# Patient Record
Sex: FEMALE | Race: BLACK OR AFRICAN AMERICAN | Employment: OTHER | URBAN - METROPOLITAN AREA
[De-identification: names, ages, dates, MRNs, and addresses within clinical notes are randomized per-mention and may not be internally consistent; named-entity substitution may affect disease eponyms.]

---

## 2022-07-12 ENCOUNTER — OFFICE VISIT (OUTPATIENT)
Dept: ORTHOPEDIC SURGERY | Age: 77
End: 2022-07-12
Payer: MEDICARE

## 2022-07-12 VITALS — WEIGHT: 180 LBS | HEIGHT: 60 IN | BODY MASS INDEX: 35.34 KG/M2

## 2022-07-12 DIAGNOSIS — M25.561 PAIN IN BOTH KNEES, UNSPECIFIED CHRONICITY: Primary | ICD-10-CM

## 2022-07-12 DIAGNOSIS — M17.11 PRIMARY OSTEOARTHRITIS OF RIGHT KNEE: ICD-10-CM

## 2022-07-12 DIAGNOSIS — M17.12 PRIMARY OSTEOARTHRITIS OF LEFT KNEE: Primary | ICD-10-CM

## 2022-07-12 DIAGNOSIS — M17.12 PRIMARY OSTEOARTHRITIS OF LEFT KNEE: ICD-10-CM

## 2022-07-12 DIAGNOSIS — M25.562 PAIN IN BOTH KNEES, UNSPECIFIED CHRONICITY: Primary | ICD-10-CM

## 2022-07-12 PROBLEM — M43.16 SPONDYLOLISTHESIS OF LUMBAR REGION: Status: ACTIVE | Noted: 2018-08-07

## 2022-07-12 PROBLEM — N18.30 STAGE 3 CHRONIC KIDNEY DISEASE (HCC): Status: ACTIVE | Noted: 2018-09-17

## 2022-07-12 PROCEDURE — 99204 OFFICE O/P NEW MOD 45 MIN: CPT | Performed by: ORTHOPAEDIC SURGERY

## 2022-07-12 PROCEDURE — 1123F ACP DISCUSS/DSCN MKR DOCD: CPT | Performed by: ORTHOPAEDIC SURGERY

## 2022-07-12 RX ORDER — MELOXICAM 7.5 MG/1
7.5 TABLET ORAL DAILY
Qty: 30 TABLET | Refills: 3 | Status: SHIPPED | OUTPATIENT
Start: 2022-07-12

## 2022-07-12 RX ORDER — LOSARTAN POTASSIUM 50 MG/1
TABLET ORAL
COMMUNITY
Start: 2022-06-07

## 2022-07-12 RX ORDER — LEVOTHYROXINE SODIUM 0.1 MG/1
TABLET ORAL
COMMUNITY
Start: 2022-03-15

## 2022-07-12 RX ORDER — EMPAGLIFLOZIN AND METFORMIN HYDROCHLORIDE 12.5; 1 MG/1; MG/1
TABLET ORAL
COMMUNITY
Start: 2022-07-04

## 2022-07-12 RX ORDER — GLIMEPIRIDE 1 MG/1
TABLET ORAL
COMMUNITY
Start: 2022-03-15

## 2022-07-12 RX ORDER — LOSARTAN POTASSIUM 100 MG/1
TABLET ORAL
COMMUNITY
Start: 2022-06-20

## 2022-07-12 RX ORDER — DOXYCYCLINE HYCLATE 50 MG/1
CAPSULE, GELATIN COATED ORAL
COMMUNITY
Start: 2021-12-13

## 2022-07-12 RX ORDER — MELOXICAM 7.5 MG/1
7.5 TABLET ORAL DAILY
Qty: 30 TABLET | Refills: 3 | Status: CANCELLED | OUTPATIENT
Start: 2022-07-12

## 2022-07-12 RX ORDER — ASPIRIN 81 MG/1
81 TABLET ORAL DAILY
COMMUNITY

## 2022-07-12 RX ORDER — LIDOCAINE AND PRILOCAINE 25; 25 MG/G; MG/G
CREAM TOPICAL
COMMUNITY
Start: 2022-03-14

## 2022-07-12 NOTE — PROGRESS NOTES
Name: Francisco Javier Sheldon  YOB: 1945  Gender: female  MRN: [id number]    CC:   Chief Complaint   Patient presents with    Knee Pain     bilateral knee pain         HPI: Francisco Javier Sheldon is a 77 y.o. female with a PMHx of HTN, DM and s/p lumbar fusion in 2018 here for evaluation of bilateral knee pain, but the right knee bothers her more. The pain has been present for several years and has become worse over the last year. The pain is primarily on the Lateral side. she describes the pain as dull, aching, sharp, throbbing, grinding and and often feels unstable  The pain is worse with any weight bearing, going up and/or down stairs, rising after sitting and walking  The pain does not radiate down the leg, but sometimes with exercise the pain will radiate up to her hip. She has not followed up with Dr. Mendel Herald since her surgery in 2018. She denies any lower back pain today. Treatment so far has been activity modification and Tylenol With temporary relief. Allergies   Allergen Reactions    Banana Cough     \"I still eat them\"  \"I still eat them\"      Peanut-Containing Drug Products Cough     \"I still eat them\"  \"I still eat them\"      Lisinopril Cough         Review of Systems:  As per HPI. Pertinent positives and negatives are addressed with the patient, particularly those related to musculoskeletal concerns. Non-orthopaedic concerns were referred back to the primary care physician. PHYSICAL EXAMINATION:   The patient appears their stated age and they are in no distress. Ht 5' (1.524 m)   Wt 180 lb (81.6 kg)   BMI 35.15 kg/m²       The lower extremities are as described below. Circulation is normal with palpable pedal pulses bilaterally and no edema. There is no lymph adenopathy in the popliteal or malleolar region. The skin is without stasis disease distally bilaterally. Sensation is intact to light touch bilaterally.   There is mild tenderness to palpation over the lateral joint line of the right knee(s)  No tenderness of the left knee  The gait is noted to be with a slight trendelenburg and antalgia and and ambulates with a Cane  Range of motion is 0-120 degrees on the right and 0-120 degrees on the left. bilateral knee: There is 2mm of anterior/posterior translation and 2mm of medial/lateral instability bilaterally. There is 2 degrees of varus alignment in the bilateral knee. Limb lengths are equal.  Quadriceps strength is excellent. Positive straight leg raise on the right  Their judgment and insight are normal.  They are oriented to time, place and person. Their memory is good and the mood and affect appropriate. X-RAY:   AP, lateral, sunrise and 45 degree  views of the bilateral knees are reviewed. There is some moderate joint space loss within the medial compartment bilaterally, eburnated bone, and osteophyte formation present. X-ray impression:  Moderate osteoarthropathy of the bilateral  knee      IMPRESSION:     ICD-10-CM    1. Pain in both knees, unspecified chronicity  M25.561 XR KNEE BILATERAL STANDING    M25.562        RECOMMENDATIONS:    Reviewed x-ray findings with the patient. This patient's clinical history and physical exam is consistent with osteoarthritis of the bilateral  knee(s). We discussed the natural history of this condition as a degenerative process that causes inflammation of the joints due to a breakdown of cartilage, the hard, thick tissue that cushions the joints. We discussed that osteoarthritis never completely resolves on its own and symptoms including pain, stiffness, and swelling may wax and wane as the condition progresses. She understands that conservative treatments typically include activity modification, NSAIDs and physical therapy. Oral and/or steroid injections into the joint could be considered in resistant scenarios. Viscosupplementation injections may also be useful as a temporary cartilage replacement in certain patients.  I advised to avoid any prolonged bedrest and to try to maintain ADLs as much as possible. The patient was counseled to follow up with me should she develop any worsening symptoms that begin to limit activities of daily living.     ----The patient will be treated observantly with self directed symptomatic care. Instructions were given to follow up if there is any neurologic or functional decline.  ---- Physical Therapy was prescribed and will include stretching, strengthening and modalities to promote blood flow, flexibility and core strengthening.  ---- NSAID: The patient is agreeable to a trial of nonsteroidal anti-inflammatory drugs (NSAIDs). We discussed risks associated with their use, including GI tract or other bleeding, and also some cardiac risk. Instructions were given to discontinue the NSAID if there is any sign of GI bleed, chest pain, or shortness of breath.    ---- Injection: The patient will be pre-certed for a visco injection to help reduce the symptoms. The patient understands the risks and benefits of the medication. The patient may benefit from additional injections depending on the response.        4--this is a chronic illness/condition with exacerbation

## 2022-07-12 NOTE — LETTER
Joint Injection Precert Authorization Form    Name: Ivan Lino  : 1945  Age: 77 y.o. Gender: female    Clinical Information    Referring Doctor: yadi  Diagnosis:     ICD-10-CM    1. Pain in both knees, unspecified chronicity  M25.561 XR KNEE BILATERAL STANDING    M25.562    2. Primary osteoarthritis of left knee  M17.12 Amb External Referral To Physical Therapy   3. Primary osteoarthritis of right knee  M17.11 Amb External Referral To Physical Therapy   . Body Part:    Type of Service    [ ] Renita Akhtar.Tonia    [ ] Rosalia Sawyer.Hiram    [ ] Synvisc - Aminata.Liming    [ ] Synvisc One -       Glendy-joni    Insurance:  Active Insurance as of 2022     Patient has no active insurance coverage on file for 2022.          Radiologic evidence to support diagnosis: yes    Non-drug therapy: yes    Pharmacologic Therapy: yes

## 2022-07-13 ENCOUNTER — TELEPHONE (OUTPATIENT)
Dept: ORTHOPEDIC SURGERY | Age: 77
End: 2022-07-13

## 2022-07-14 ENCOUNTER — TELEPHONE (OUTPATIENT)
Dept: ORTHOPEDIC SURGERY | Age: 77
End: 2022-07-14

## 2022-08-04 ENCOUNTER — OFFICE VISIT (OUTPATIENT)
Dept: ORTHOPEDIC SURGERY | Age: 77
End: 2022-08-04
Payer: MEDICARE

## 2022-08-04 DIAGNOSIS — M17.12 PRIMARY OSTEOARTHRITIS OF LEFT KNEE: Primary | ICD-10-CM

## 2022-08-04 DIAGNOSIS — M17.11 PRIMARY OSTEOARTHRITIS OF RIGHT KNEE: ICD-10-CM

## 2022-08-04 PROCEDURE — 20610 DRAIN/INJ JOINT/BURSA W/O US: CPT | Performed by: ORTHOPAEDIC SURGERY

## 2022-08-04 NOTE — PROGRESS NOTES
Name: Alayna Hawley  YOB: 1945  Gender: female  MRN: [id number]    Allergies   Allergen Reactions    Banana Cough     \"I still eat them\"  \"I still eat them\"      Peanut-Containing Drug Products Cough     \"I still eat them\"  \"I still eat them\"      Lisinopril Cough       CC:  bilateral knee pain, Osteoarthritis    PROCEDURE:  HA injection(s). All questions answered. Procedure Note: The patient was placed in upright position with both knees hanging freely from exam table. Both knees were prepped in sterile fashion using alcohol wipe(s). Using an infrapatellar approach, 6cc Synvisc One was injected freely. The needle was then removed, pressure hemostatis achieved, injection site was cleansed with alcohol wipe and dressed with band aid. The patient tolerated the procedure without complication. The patient  will follow up as scheduled.     SANJANA Lara  08/04/22

## 2022-12-08 ENCOUNTER — OFFICE VISIT (OUTPATIENT)
Dept: ORTHOPEDIC SURGERY | Age: 77
End: 2022-12-08

## 2022-12-08 DIAGNOSIS — M17.11 PRIMARY OSTEOARTHRITIS OF RIGHT KNEE: ICD-10-CM

## 2022-12-08 DIAGNOSIS — M17.12 PRIMARY OSTEOARTHRITIS OF LEFT KNEE: Primary | ICD-10-CM

## 2022-12-08 NOTE — PROGRESS NOTES
Viscosupplementation Follow-up    [unfilled]     Allergies   Allergen Reactions    Banana Cough     \"I still eat them\"  \"I still eat them\"      Peanut-Containing Drug Products Cough     \"I still eat them\"  \"I still eat them\"      Lisinopril Cough        Current Outpatient Medications on File Prior to Visit   Medication Sig Dispense Refill    aspirin 81 MG EC tablet Take 81 mg by mouth daily      SYNJARDY 12.5-1000 MG TABS TAKE 1 TABLET BY MOUTH TWICE A DAY      ferrous gluconate (FERGON) 324 (38 Fe) MG tablet TAKE 1 TABLET BY MOUTH TWICE A DAY      glimepiride (AMARYL) 1 MG tablet TAKE 1 TABLET BY MOUTH EVERY DAY WITH BREAKFAST      levothyroxine (SYNTHROID) 100 MCG tablet TAKE 1 TABLET BY MOUTH EVERY DAY      lidocaine-prilocaine (EMLA) 2.5-2.5 % cream Please specify directions, refills and quantity      losartan (COZAAR) 100 MG tablet TAKE 1 TABLET BY MOUTH EVERY DAY      losartan (COZAAR) 50 MG tablet TAKE 1 TABLET BY MOUTH EVERY DAY      meloxicam (MOBIC) 7.5 MG tablet Take 1 tablet by mouth daily 30 tablet 3     No current facility-administered medications on file prior to visit. CC: bilateral  knee pain    History: Patient returns today for osteoarthritis of bilateral  knee. We have been treating this conservatively with modification of activities, knee exercise program and Viscosupplementaion. They have noted improvement in symptoms of knee pain with viscosupplementation. It has been approximately 4 months since the last series of injections. They would like to have reinjection of bilateral  knee as it did provide good relief and they are beginning to have recurrence of their symptoms. Disposition: The patient has osteoarthritis of the bilateral knee. I have recommended repeat Viscosupplementation. This will be scheduled and arranged. Keep scheduled appointment for injection at 6 months.

## 2023-02-09 ENCOUNTER — OFFICE VISIT (OUTPATIENT)
Dept: ORTHOPEDIC SURGERY | Age: 78
End: 2023-02-09

## 2023-02-09 DIAGNOSIS — M17.12 PRIMARY OSTEOARTHRITIS OF LEFT KNEE: Primary | ICD-10-CM

## 2023-02-09 DIAGNOSIS — M17.11 PRIMARY OSTEOARTHRITIS OF RIGHT KNEE: ICD-10-CM

## 2023-02-09 NOTE — PROGRESS NOTES
Name: Anuj Markham  YOB: 1945  Gender: female  MRN: [id number]    Allergies   Allergen Reactions    Banana Cough     \"I still eat them\"  \"I still eat them\"      Peanut-Containing Drug Products Cough     \"I still eat them\"  \"I still eat them\"      Lisinopril Cough       CC:  bilateral knee pain, Osteoarthritis    PROCEDURE:  HA injection(s). All questions answered. Procedure Note: The patient was placed in upright position with both knees hanging freely from exam table. Both knees were prepped in sterile fashion using alcohol wipe(s). Using an infrapatellar approach, 4cc of Monovisc was injected freely. The needle was then removed, pressure hemostatis achieved, injection site was cleansed with alcohol wipe and dressed with band aid. The patient tolerated the procedure without complication. The patient  will follow up as scheduled.     SANJANA Hidalgo  02/09/23

## 2023-08-08 DIAGNOSIS — M17.12 PRIMARY OSTEOARTHRITIS OF LEFT KNEE: ICD-10-CM

## 2023-08-08 DIAGNOSIS — M17.11 PRIMARY OSTEOARTHRITIS OF RIGHT KNEE: ICD-10-CM

## 2023-08-09 RX ORDER — MELOXICAM 7.5 MG/1
TABLET ORAL
Qty: 30 TABLET | Refills: 3 | OUTPATIENT
Start: 2023-08-09